# Patient Record
Sex: MALE | Race: WHITE | Employment: FULL TIME | URBAN - METROPOLITAN AREA
[De-identification: names, ages, dates, MRNs, and addresses within clinical notes are randomized per-mention and may not be internally consistent; named-entity substitution may affect disease eponyms.]

---

## 2017-12-13 ENCOUNTER — OFFICE VISIT (OUTPATIENT)
Dept: ORTHOPEDIC SURGERY | Age: 52
End: 2017-12-13

## 2017-12-13 VITALS — WEIGHT: 252.8 LBS | HEIGHT: 69 IN | RESPIRATION RATE: 16 BRPM | BODY MASS INDEX: 37.44 KG/M2

## 2017-12-13 DIAGNOSIS — M79.641 RIGHT HAND PAIN: Primary | ICD-10-CM
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
## 2017-12-13 NOTE — PATIENT INSTRUCTIONS
Hand Pain: Care Instructions  Your Care Instructions    Common causes of hand pain are overuse and injuries, such as might happen during sports or home repair projects. Everyday wear and tear, especially as you get older, also can cause hand pain. Most minor hand injuries will heal on their own, and home treatment is usually all you need to do. If you have sudden and severe pain, you may need tests and treatment. Follow-up care is a key part of your treatment and safety. Be sure to make and go to all appointments, and call your doctor if you are having problems. It's also a good idea to know your test results and keep a list of the medicines you take. How can you care for yourself at home? · Take pain medicines exactly as directed. ¨ If the doctor gave you a prescription medicine for pain, take it as prescribed. ¨ If you are not taking a prescription pain medicine, ask your doctor if you can take an over-the-counter medicine. · Rest and protect your hand. Take a break from any activity that may cause pain. · Put ice or a cold pack on your hand for 10 to 20 minutes at a time. Put a thin cloth between the ice and your skin. · Prop up the sore hand on a pillow when you ice it or anytime you sit or lie down during the next 3 days. Try to keep it above the level of your heart. This will help reduce swelling. · If your doctor recommends a sling, splint, or elastic bandage to support your hand, wear it as directed. When should you call for help? Call 911 anytime you think you may need emergency care. For example, call if:  ? · Your hand turns cool or pale or changes color. ?Call your doctor now or seek immediate medical care if:  ? · You cannot move your hand. ? · Your hand pops, moves out of its normal position, and then returns to its normal position. ? · You have signs of infection, such as:  ¨ Increased pain, swelling, warmth, or redness. ¨ Red streaks leading from the sore area.   ¨ Pus draining from a place on your hand. ¨ A fever. ? · Your hand feels numb or tingly. ? Watch closely for changes in your health, and be sure to contact your doctor if:  ? · Your hand feels unstable when you try to use it. ? · You do not get better as expected. ? · You have any new symptoms, such as swelling. ? · Bruises from an injury to your hand last longer than 2 weeks. Where can you learn more? Go to http://yunier-abel.info/. Enter R273 in the search box to learn more about \"Hand Pain: Care Instructions. \"  Current as of: March 20, 2017  Content Version: 11.4  © 4062-5411 [employer]. Care instructions adapted under license by [employer] (which disclaims liability or warranty for this information). If you have questions about a medical condition or this instruction, always ask your healthcare professional. [address] any warranty or liability for your use of this information.

## 2017-12-13 NOTE — PROGRESS NOTES
Patient: Juan Carlos Clay                MRN: [id number]       SSN: [id number]  YOB: 1965        AGE: 47 y.o. SEX: male  Body mass index is 37.33 kg/(m^2). PCP: Alix Taylor MD  12/13/17    CC: Right hand pain    HPI: Edgar Escudero is a 47year old right handed male who presents with 1 month of right hand pain. He was injured at his job where he works for [employer] in [place], [place]. He was standing on a ladder which fell over and he landed on his right hand and elbow. His elbow pain has subsided but he has continued to note pain and stiffness in the right hand. Specifically in the right middle, ring and long fingers with pain at the MCP joints of those fingers. He is unable to make a full fist due to the pain and also complains of weakness with  strength. He has continued to work during this  Time. He denies any numbness or tingling. His work involves carrying and lifting with his right hand which is difficult. He works as a . History reviewed. No pertinent past medical history. Past Surgical History:   Procedure Laterality Date    HX APPENDECTOMY  2003       Family History   Problem Relation Age of Onset    Dementia Mother     Heart Disease Mother     COPD Father        Social History     Social History    Marital status:      Spouse name: N/A    Number of children: N/A    Years of education: N/A     Occupational History    Not on file.      Social History Main Topics    Smoking status: Former Smoker     Quit date: 2002    Smokeless tobacco: Never Used    Alcohol use No    Drug use: Not on file    Sexual activity: Not on file     Other Topics Concern    Not on file     Social History Narrative    No narrative on file           No Known Allergies      REVIEW OF SYSTEMS:      CON: negative for recent weight loss/gain, fever, or chills  EYE: negative for double or blurry vision  ENT: negative for hoarseness  RS:   negative for cough, URI, SOB  CV:  negative for chest pain, palpitations  GI:    negative for blood in stool, nausea/vomiting  :  negative for blood in urine  MS: As per HPI  Other systems reviewed and noted below. PHYSICAL EXAMINATION:  Visit Vitals    Resp 16    Ht 5' 9\" (1.753 m)    Wt 252 lb 12.8 oz (114.7 kg)    BMI 37.33 kg/m2       GENERAL: Alert and oriented x3, in no acute distress, well-developed, well-nourished. HEENT: Normocephalic, atraumatic. RESP: Non labored breathing with equal chest rise on inspiration. CV: Well perfused extremities. No cyanosis or clubbing noted. ABDOMEN: Soft, non-tender, non-distended. Neck: Full ROM, no pain with ROM testing. MS: Right hand with minimal swelling, no obvious deformity, no signs of trauma. SILT R/U/M distally. Cap refill of fingers WNL. Able to flex/extend each finger and thumb at MCP, DIP, and PIP joints and IP joint of thumb actively and passively. Active and passive full flexion of the MCP/DIP/PIP joints of the small, ring and middle finger of right hand limited due to pain. Minimal pain with motion outside of limits of full flexion at these joints. TTP over MCP joints and collateral ligament insertion sites of small, ring and middle finger. Mild pain with varus/valgus stress at MCP joints. Radiology: 3 views of right hand negative for fracture/dislocation or other acute bony injury. Impression/Plan: Hilario Clifford has a right hand MCP/PIP/DIP sprain of the middle, ring and small fingers. I do not see any evidence of fractures. He has some stiffness associated with his work related injury which is also contributing to his pain. I have recommended that we get him into OT for his right hand to work on his ROM and strengthening and have ordered this today. He can continue to work at this time within his limits of pain. I will see him back in 4 weeks. Home stretching exercises were also discussed.   All of his questions were answered.         Electronically signed by: Virginia Guadarrama MD

## 2018-01-10 ENCOUNTER — OFFICE VISIT (OUTPATIENT)
Dept: ORTHOPEDIC SURGERY | Age: 53
End: 2018-01-10

## 2018-01-10 VITALS
WEIGHT: 252.8 LBS | DIASTOLIC BLOOD PRESSURE: 81 MMHG | HEART RATE: 69 BPM | BODY MASS INDEX: 37.44 KG/M2 | RESPIRATION RATE: 16 BRPM | OXYGEN SATURATION: 96 % | SYSTOLIC BLOOD PRESSURE: 143 MMHG | HEIGHT: 69 IN

## 2018-01-10 DIAGNOSIS — M79.641 RIGHT HAND PAIN: Primary | ICD-10-CM

## 2018-01-10 RX ORDER — DICLOFENAC SODIUM 50 MG/1
50 TABLET, DELAYED RELEASE ORAL 2 TIMES DAILY WITH MEALS
Qty: 60 TAB | Refills: 1 | Status: SHIPPED | OUTPATIENT
Start: 2018-01-10

## 2018-01-10 NOTE — PATIENT INSTRUCTIONS
Hand Pain: Care Instructions  Your Care Instructions    Common causes of hand pain are overuse and injuries, such as might happen during sports or home repair projects. Everyday wear and tear, especially as you get older, also can cause hand pain. Most minor hand injuries will heal on their own, and home treatment is usually all you need to do. If you have sudden and severe pain, you may need tests and treatment. Follow-up care is a key part of your treatment and safety. Be sure to make and go to all appointments, and call your doctor if you are having problems. It's also a good idea to know your test results and keep a list of the medicines you take. How can you care for yourself at home? · Take pain medicines exactly as directed. ¨ If the doctor gave you a prescription medicine for pain, take it as prescribed. ¨ If you are not taking a prescription pain medicine, ask your doctor if you can take an over-the-counter medicine. · Rest and protect your hand. Take a break from any activity that may cause pain. · Put ice or a cold pack on your hand for 10 to 20 minutes at a time. Put a thin cloth between the ice and your skin. · Prop up the sore hand on a pillow when you ice it or anytime you sit or lie down during the next 3 days. Try to keep it above the level of your heart. This will help reduce swelling. · If your doctor recommends a sling, splint, or elastic bandage to support your hand, wear it as directed. When should you call for help? Call 911 anytime you think you may need emergency care. For example, call if:  ? · Your hand turns cool or pale or changes color. ?Call your doctor now or seek immediate medical care if:  ? · You cannot move your hand. ? · Your hand pops, moves out of its normal position, and then returns to its normal position. ? · You have signs of infection, such as:  ¨ Increased pain, swelling, warmth, or redness. ¨ Red streaks leading from the sore area.   ¨ Pus draining from a place on your hand. ¨ A fever. ? · Your hand feels numb or tingly. ? Watch closely for changes in your health, and be sure to contact your doctor if:  ? · Your hand feels unstable when you try to use it. ? · You do not get better as expected. ? · You have any new symptoms, such as swelling. ? · Bruises from an injury to your hand last longer than 2 weeks. Where can you learn more? Go to http://yunier-abel.info/. Enter R273 in the search box to learn more about \"Hand Pain: Care Instructions. \"  Current as of: March 20, 2017  Content Version: 11.4  © 9066-1760 [employer]. Care instructions adapted under license by [employer] (which disclaims liability or warranty for this information). If you have questions about a medical condition or this instruction, always ask your healthcare professional. [address] any warranty or liability for your use of this information.

## 2018-01-10 NOTE — MR AVS SNAPSHOT
Visit Information Date & Time Provider Department Dept. Phone Encounter #  
 1/10/2018 10:30 AM Ankita Frederick MD [facility] [place] [phone] [id number] Follow-up Instructions Return in about 4 weeks (around 2/7/2018). Upcoming Health Maintenance Date Due DTaP/Tdap/Td series (1 - Tdap) 5/14/1987 FOBT Q 1 YEAR AGE 50-75 5/14/2016 Influenza Age 5 to Adult 8/1/2017 Allergies as of 1/10/2018  Review Complete On: 1/10/2018 By: Maria L Del Real LPN No Known Allergies Current Immunizations  Never Reviewed No immunizations on file. Not reviewed this visit You Were Diagnosed With   
  
 Codes Comments Right hand pain    -  Primary ICD-10-CM: C34.767 ICD-9-CM: 729.5 Vitals BP Pulse Resp Height(growth percentile) Weight(growth percentile) SpO2  
 143/81 (BP 1 Location: Left arm, BP Patient Position: Sitting) 69 16 5' 9\" (1.753 m) 252 lb 12.8 oz (114.7 kg) 96% BMI Smoking Status 37.33 kg/m2 Former Smoker BMI and BSA Data Body Mass Index Body Surface Area  
 37.33 kg/m 2 2.36 m 2 Preferred Pharmacy Pharmacy Name Phone [id number] [address] Your Updated Medication List  
  
   
This list is accurate as of: 1/10/18 11:14 AM.  Always use your most recent med list.  
  
  
  
  
 diclofenac EC 50 mg EC tablet Commonly known as:  VOLTAREN Take 1 Tab by mouth two (2) times daily (with meals). Prescriptions Sent to Pharmacy Refills  
 diclofenac EC (VOLTAREN) 50 mg EC tablet 1 Sig: Take 1 Tab by mouth two (2) times daily (with meals). Class: Normal  
 [facility] [address] [place] [address], [address] [place] [address] Ph #: [phone] Route: Oral  
  
Follow-up Instructions Return in about 4 weeks (around 2/7/2018). Patient Instructions Hand Pain: Care Instructions Your Care Instructions Common causes of hand pain are overuse and injuries, such as might happen during sports or home repair projects. Everyday wear and tear, especially as you get older, also can cause hand pain. Most minor hand injuries will heal on their own, and home treatment is usually all you need to do. If you have sudden and severe pain, you may need tests and treatment. Follow-up care is a key part of your treatment and safety. Be sure to make and go to all appointments, and call your doctor if you are having problems. It's also a good idea to know your test results and keep a list of the medicines you take. How can you care for yourself at home? · Take pain medicines exactly as directed. ¨ If the doctor gave you a prescription medicine for pain, take it as prescribed. ¨ If you are not taking a prescription pain medicine, ask your doctor if you can take an over-the-counter medicine. · Rest and protect your hand. Take a break from any activity that may cause pain. · Put ice or a cold pack on your hand for 10 to 20 minutes at a time. Put a thin cloth between the ice and your skin. · Prop up the sore hand on a pillow when you ice it or anytime you sit or lie down during the next 3 days. Try to keep it above the level of your heart. This will help reduce swelling. · If your doctor recommends a sling, splint, or elastic bandage to support your hand, wear it as directed. When should you call for help? Call 911 anytime you think you may need emergency care. For example, call if: 
? · Your hand turns cool or pale or changes color. ?Call your doctor now or seek immediate medical care if: 
? · You cannot move your hand. ? · Your hand pops, moves out of its normal position, and then returns to its normal position. ? · You have signs of infection, such as: 
¨ Increased pain, swelling, warmth, or redness. ¨ Red streaks leading from the sore area. ¨ Pus draining from a place on your hand. ¨ A fever. ? · Your hand feels numb or tingly. ? Watch closely for changes in your health, and be sure to contact your doctor if: 
? · Your hand feels unstable when you try to use it. ? · You do not get better as expected. ? · You have any new symptoms, such as swelling. ? · Bruises from an injury to your hand last longer than 2 weeks. Where can you learn more? Go to http://yunier-abel.info/. Enter R273 in the search box to learn more about \"Hand Pain: Care Instructions. \" Current as of: March 20, 2017 Content Version: 11.4 © 3737-7391 [employer]. Care instructions adapted under license by [employer] (which disclaims liability or warranty for this information). If you have questions about a medical condition or this instruction, always ask your healthcare professional. [place] [address] any warranty or liability for your use of this information. Introducing \[facility]\"" & HEALTH SERVICES! [facility] introduces PlanetTran patient portal. Now you can access parts of your medical record, email your doctor's office, and request medication refills online. 1. In your internet browser, go to [email or web address] 2. Click on the First Time User? Click Here link in the Sign In box. You will see the New Member Sign Up page. 3. Enter your PlanetTran Access Code exactly as it appears below. You will not need to use this code after youve completed the sign-up process. If you do not sign up before the expiration date, you must request a new code. · PlanetTran Access Code: [email or web address] Expires: 3/13/2018  1:32 PM 
 
4. Enter the last four digits of your Social Security Number (xxxx) and Date of Birth (mm/dd/yyyy) as indicated and click Submit. You will be taken to the next sign-up page. 5. Create a PlanetTran ID.  This will be your PlanetTran login ID and cannot be changed, so think of one that is secure and easy to remember. 6. Create a xzoops password. You can change your password at any time. 7. Enter your Password Reset Question and Answer. This can be used at a later time if you forget your password. 8. Enter your e-mail address. You will receive e-mail notification when new information is available in [address] 9. Click Sign Up. You can now view and download portions of your medical record. 10. Click the Download Summary menu link to download a portable copy of your medical information. If you have questions, please visit the Frequently Asked Questions section of the xzoops website. Remember, xzoops is NOT to be used for urgent needs. For medical emergencies, dial 911. Now available from your iPhone and Android! Please provide this summary of care documentation to your next provider. Your primary care clinician is listed as [facility]. If you have any questions after today's visit, please call [phone].

## 2018-02-14 ENCOUNTER — OFFICE VISIT (OUTPATIENT)
Dept: ORTHOPEDIC SURGERY | Age: 53
End: 2018-02-14

## 2018-02-14 VITALS
HEART RATE: 71 BPM | DIASTOLIC BLOOD PRESSURE: 84 MMHG | BODY MASS INDEX: 37.92 KG/M2 | WEIGHT: 256 LBS | RESPIRATION RATE: 16 BRPM | OXYGEN SATURATION: 96 % | HEIGHT: 69 IN | SYSTOLIC BLOOD PRESSURE: 143 MMHG

## 2018-02-14 DIAGNOSIS — M79.641 RIGHT HAND PAIN: Primary | ICD-10-CM

## 2018-02-14 NOTE — PROGRESS NOTES
Patient: Mandy Martin                MRN: [id number]       SSN: [id number]  YOB: 1965        AGE: 47 y.o. SEX: male  Body mass index is 37.8 kg/(m^2). PCP: ONELIA FELIZ MD  02/14/18    HISTORY OF PRESENT ILLNESS: Mr. Juan Antonio Garcia returns today for follow-up of his right hand. He has been doing some home stretching exercises as well as taking an anti-inflammatory. He unfortunately continues to have some issues and pain specifically with  and with terminal flexion of his ring and small finger on his right hand. Due to this lack of confidence in flexion and  strength, he states he does not feel comfortable using it to carry things. It is affecting his activities of daily living. He denies any recent trauma with it. The swelling has mostly gone done. At this point he has not seen a lot of progress since his last visit about one month ago. History reviewed. No pertinent past medical history. Family History   Problem Relation Age of Onset    Dementia Mother     Heart Disease Mother     COPD Father        Current Outpatient Prescriptions   Medication Sig Dispense Refill    diclofenac EC (VOLTAREN) 50 mg EC tablet Take 1 Tab by mouth two (2) times daily (with meals). (Patient not taking: Reported on 2/14/2018) 60 Tab 1       No Known Allergies    Past Surgical History:   Procedure Laterality Date    HX APPENDECTOMY  2003       Social History     Social History    Marital status:      Spouse name: N/A    Number of children: N/A    Years of education: N/A     Occupational History    Not on file.      Social History Main Topics    Smoking status: Former Smoker     Quit date: 2002    Smokeless tobacco: Never Used    Alcohol use No    Drug use: Not on file    Sexual activity: Not on file     Other Topics Concern    Not on file     Social History Narrative       REVIEW OF SYSTEMS:      No changes from previous review of systems unless noted.    PHYSICAL EXAMINATION:  Visit Vitals    /84 (BP 1 Location: Left arm, BP Patient Position: Sitting)    Pulse 71    Resp 16    Ht 5' 9\" (1.753 m)    Wt 256 lb (116.1 kg)    SpO2 96%    BMI 37.8 kg/m2     Body mass index is 37.8 kg/(m^2). GENERAL: Alert and oriented x3, in no acute distress. HEENT: Normocephalic, atraumatic. RESP: Non labored breathing. SKIN: No rashes or lesions noted. PHYSICAL EXAMINATION:  Musculoskeletal examination of the right hand is mostly unchanged from his previous visit. He does not have any swelling. No abnormal deformities of his hand. I think the flexion is slightly improved since his last visit. He is almost able to make a fist with his small and ring finger on his right hand, but he does have pain as well as some stiffness associated with terminal DIP flexion. He also has a little bit of stiffness with MCP and PIP flexion on those two fingers as well. His sensation is intact and his capillary refill is within normal limits. ASSESSMENT/PLAN:  Mr. Kesha Delgado is a 56-year-old male who had a trauma to his right hand and continues to have some issues with making a fist as well as pain in his right hand. I think this has to do with some ligament stiffness between his MCP, DIP, and PIP joints of his small and ring finger. However, since he has not seen much progress in the way of pain control as well as motion and  strength since his last visit, I think I would like to get him into see a hand specialist for a second opinion. I have requested that he go see Dr. Gerson Thorpe with [facility]. I have sent over a referral.  I will await the results of the second opinion to find out if we should change his course of treatment at this time.   He understands this plan and is in agreement with it and will get back in touch with me after he has finished seeing the hand specialist.  Otherwise I would like to see him back in six weeks.           Electronically signed by: Janice Lovell MD

## 2019-08-24 NOTE — PROGRESS NOTES
Patient: Coleen Rios                MRN: [id number]       SSN: [id number]  YOB: 1965        AGE: 47 y.o. SEX: male  Body mass index is 37.33 kg/(m^2). PCP: Art Granados MD  01/10/18    Chief Complaint: No changes from previous visit complaint. HPI: Coleen Rios returns today for a follow up appointment for Right hand pain and stiffness. He has been going to OT and unfortunately his symptoms have not gotten much better. He reports pain in his right hand along with stiffness and difficulty with making a fist.  The OT has been twice per week and he has been doing home stretching. He occasionally takes OTC medications to help his pain. No new injuries. History reviewed. No pertinent past medical history. Family History   Problem Relation Age of Onset    Dementia Mother     Heart Disease Mother     COPD Father        Current Outpatient Prescriptions   Medication Sig Dispense Refill    diclofenac EC (VOLTAREN) 50 mg EC tablet Take 1 Tab by mouth two (2) times daily (with meals). 61 Tab 1       No Known Allergies    Past Surgical History:   Procedure Laterality Date    HX APPENDECTOMY  2003       Social History     Social History    Marital status:      Spouse name: N/A    Number of children: N/A    Years of education: N/A     Occupational History    Not on file. Social History Main Topics    Smoking status: Former Smoker     Quit date: 2002    Smokeless tobacco: Never Used    Alcohol use No    Drug use: Not on file    Sexual activity: Not on file     Other Topics Concern    Not on file     Social History Narrative       REVIEW OF SYSTEMS:      No changes from previous review of systems unless noted. PHYSICAL EXAMINATION:  GENERAL: Alert and oriented x3, in no acute distress. HEENT: Normocephalic, atraumatic. RESP: Non labored breathing. SKIN: No rashes or lesions noted.    MUSCULOSKELETAL: Right hand with mild swelling over ring, long and small finger. Difficulty with fully making a fist with the right hand with terminal flexion of DIP joints of small and ring finger. Pain with attempts at terminal flexion. SILT/NVI distally. No warmth or erythema. Cap refill WNL. TTP over DIP and PIP joints. Radiology: None taken today    Impression/Plan:   1. Right hand stiffness is persistent and maybe has worsened slightly since his last visit. Certainly his pain is subjectively worse. I am going to back his hand OT down to once per week and he is going to do exercises at home. I have also recommended that he take diclofenac to help with the pain for the next month. I will see him back in 1 month for another check.       Electronically signed by: Barry Barnhart MD No

## 2021-12-31 ENCOUNTER — HOSPITAL ENCOUNTER (EMERGENCY)
Age: 56
Discharge: HOME | End: 2021-12-31
Attending: EMERGENCY MEDICINE
Payer: COMMERCIAL

## 2021-12-31 VITALS
BODY MASS INDEX: 35.79 KG/M2 | HEIGHT: 70 IN | WEIGHT: 250 LBS | DIASTOLIC BLOOD PRESSURE: 90 MMHG | HEART RATE: 77 BPM | SYSTOLIC BLOOD PRESSURE: 156 MMHG | TEMPERATURE: 98.3 F | OXYGEN SATURATION: 96 % | RESPIRATION RATE: 20 BRPM

## 2021-12-31 DIAGNOSIS — S01.01XA LACERATION OF SCALP, INITIAL ENCOUNTER: Primary | ICD-10-CM

## 2021-12-31 PROCEDURE — 99283 EMERGENCY DEPT VISIT LOW MDM: CPT

## 2021-12-31 PROCEDURE — 75810000293 HC SIMP/SUPERF WND  RPR

## 2021-12-31 RX ORDER — LIDOCAINE HYDROCHLORIDE AND EPINEPHRINE 10; 10 MG/ML; UG/ML
10 INJECTION, SOLUTION INFILTRATION; PERINEURAL ONCE
Status: DISCONTINUED | OUTPATIENT
Start: 2021-12-31 | End: 2021-12-31 | Stop reason: HOSPADM

## 2021-12-31 RX ORDER — KETOROLAC TROMETHAMINE 10 MG/1
10 TABLET, FILM COATED ORAL
Qty: 10 TABLET | Refills: 0 | Status: SHIPPED | OUTPATIENT
Start: 2021-12-31

## 2021-12-31 RX ORDER — DOXYCYCLINE 100 MG/1
100 CAPSULE ORAL 2 TIMES DAILY
Qty: 10 CAPSULE | Refills: 0 | Status: SHIPPED | OUTPATIENT
Start: 2021-12-31 | End: 2022-01-07

## 2021-12-31 RX ORDER — LISINOPRIL AND HYDROCHLOROTHIAZIDE 12.5; 2 MG/1; MG/1
1 TABLET ORAL DAILY
COMMUNITY
Start: 2021-11-29

## 2021-12-31 NOTE — ED TRIAGE NOTES
Pt reports he was walking under a rack that had bolts on the bottom when he hit his head causing a laceration, last tetanus vaccine was about 3 years ago.

## 2022-01-04 NOTE — ED PROVIDER NOTES
EMERGENCY DEPARTMENT HISTORY AND PHYSICAL EXAM      Date: 2021  Patient Name: Leatha Ny    History of Presenting Illness     Chief Complaint   Patient presents with    Laceration       History Provided By: Patient    HPI: Leatha Ny, 55 y.o. male with a past medical history significant hypertension and covid presents to the ED with cc of laceration to the head. Patient reports he was walking and a rake that he bowls at the bottom when he hit his head causing a laceration. Injury happened this morning. He has no headache. Covid positive but he has no chest pain, shortness of breath or fever. There are no other complaints, changes, or physical findings at this time. PCP: Lisandro Yan. Papi Castro MD    No current facility-administered medications on file prior to encounter. Current Outpatient Medications on File Prior to Encounter   Medication Sig Dispense Refill    lisinopril-hydroCHLOROthiazide (PRINZIDE, ZESTORETIC) 20-12.5 mg per tablet Take 1 Tablet by mouth daily.  diclofenac EC (VOLTAREN) 50 mg EC tablet Take 1 Tab by mouth two (2) times daily (with meals). (Patient not taking: Reported on 2018) 60 Tab 1       Past History     Past Medical History:  Past Medical History:   Diagnosis Date    Hypertension        Past Surgical History:  Past Surgical History:   Procedure Laterality Date    HX APPENDECTOMY         Family History:  Family History   Problem Relation Age of Onset    Dementia Mother     Heart Disease Mother     COPD Father        Social History:  Social History     Tobacco Use    Smoking status: Former Smoker     Quit date:      Years since quittin.0    Smokeless tobacco: Never Used   Vaping Use    Vaping Use: Never used   Substance Use Topics    Alcohol use: No    Drug use: Never       Allergies:  No Known Allergies      Review of Systems     Review of Systems   All other systems reviewed and are negative.       Physical Exam     Physical Exam  Vitals and nursing note reviewed. Constitutional:       General: He is not in acute distress. Appearance: He is well-developed. He is not diaphoretic. Comments: Appears in no acute distress. HENT:      Head: Normocephalic and atraumatic. No right periorbital erythema or left periorbital erythema. Jaw: No trismus. Right Ear: External ear normal. No drainage or swelling. Tympanic membrane is not perforated, erythematous or bulging. Left Ear: External ear normal. No drainage or swelling. Tympanic membrane is not perforated, erythematous or bulging. Nose: Nose normal. No mucosal edema or rhinorrhea. Right Sinus: No maxillary sinus tenderness or frontal sinus tenderness. Left Sinus: No maxillary sinus tenderness or frontal sinus tenderness. Mouth/Throat:      Mouth: No oral lesions. Dentition: No dental abscesses. Pharynx: Uvula midline. No oropharyngeal exudate, posterior oropharyngeal erythema or uvula swelling. Tonsils: No tonsillar abscesses. Eyes:      General: No scleral icterus. Right eye: No discharge. Left eye: No discharge. Conjunctiva/sclera: Conjunctivae normal.   Cardiovascular:      Rate and Rhythm: Normal rate and regular rhythm. Heart sounds: Normal heart sounds. No murmur heard. No friction rub. No gallop. Pulmonary:      Effort: Pulmonary effort is normal. No tachypnea, accessory muscle usage or respiratory distress. Breath sounds: Normal breath sounds. No decreased breath sounds, wheezing, rhonchi or rales. Abdominal:      General: Bowel sounds are normal. There is no distension. Palpations: Abdomen is soft. Tenderness: There is no abdominal tenderness. Musculoskeletal:         General: No tenderness. Normal range of motion. Cervical back: Normal range of motion and neck supple. Lymphadenopathy:      Cervical: No cervical adenopathy. Skin:     General: Skin is warm and dry. Neurological:      Mental Status: He is alert and oriented to person, place, and time. Psychiatric:         Judgment: Judgment normal.         Lab and Diagnostic Study Results     Labs -   No results found for this or any previous visit (from the past 12 hour(s)). Radiologic Studies -   @lastxrresult@  CT Results  (Last 48 hours)    None        CXR Results  (Last 48 hours)    None            Medical Decision Making   - I am the first provider for this patient. - I reviewed the vital signs, available nursing notes, past medical history, past surgical history, family history and social history. - Initial assessment performed. The patients presenting problems have been discussed, and they are in agreement with the care plan formulated and outlined with them. I have encouraged them to ask questions as they arise throughout their visit. Vital Signs-Reviewed the patient's vital signs. No data found. Records Reviewed: Nursing Notes and Old Medical Records    The patient presents with scalp laceration. ED Course:          Provider Notes (Medical Decision Making):           Procedures   Medical Decision Makingedical Decision Making  Performed by: Mattie Veliz MD  PROCEDURES:  Wound Repair    Date/Time: 12/31/2021 6:30 PM  Performed by: attendingPreparation: skin prepped with Betadine  Pre-procedure re-eval: Immediately prior to the procedure, the patient was reevaluated and found suitable for the planned procedure and any planned medications. Time out: Immediately prior to the procedure a time out was called to verify the correct patient, procedure, equipment, staff and marking as appropriate. .  Location details: scalp  Wound length:12.6 - 20 cm  Anesthesia: local infiltration    Anesthesia:  Anesthetic total: 5 mL  Irrigation solution: saline  Irrigation method: jet lavage  Debridement: minimal  Skin closure: staples  Number of sutures: 14  Approximation: close  Dressing: antibiotic ointment  Patient tolerance: patient tolerated the procedure well with no immediate complications  My total time at bedside, performing this procedure was 16-30 minutes. Disposition   Disposition: Condition stable and improved  DC- Adult Discharges: All of the diagnostic tests were reviewed and questions answered. Diagnosis, care plan and treatment options were discussed. The patient understands the instructions and will follow up as directed. The patients results have been reviewed with them. They have been counseled regarding their diagnosis. The patient verbally convey understanding and agreement of the signs, symptoms, diagnosis, treatment and prognosis and additionally agrees to follow up as recommended with their PCP in 24 - 48 hours. They also agree with the care-plan and convey that all of their questions have been answered. I have also put together some discharge instructions for them that include: 1) educational information regarding their diagnosis, 2) how to care for their diagnosis at home, as well a 3) list of reasons why they would want to return to the ED prior to their follow-up appointment, should their condition change. Diagnosis:   1. Laceration of scalp, initial encounter          Disposition:     Follow-up Information     Follow up With Specialties Details Why KWESI Perdomo, [address] In 1 week For suture removal [address] [place] [id number]  [phone]      [facility] EMERGENCY DEPT Emergency Medicine   [place] [id number]  [phone]          Discharge Medication List as of 12/31/2021  7:21 PM      START taking these medications    Details   doxycycline (MONODOX) 100 mg capsule Take 1 Capsule by mouth two (2) times a day for 7 days. , Normal, Disp-10 Capsule, R-0      ketorolac (TORADOL) 10 mg tablet Take 1 Tablet by mouth every eight (8) hours as needed for Pain., Normal, Disp-10 Tablet, R-0         CONTINUE these medications which have NOT CHANGED    Details   lisinopril-hydroCHLOROthiazide (PRINZIDE, ZESTORETIC) 20-12.5 mg per tablet Take 1 Tablet by mouth daily. , Historical Med      diclofenac EC (VOLTAREN) 50 mg EC tablet Take 1 Tab by mouth two (2) times daily (with meals). , Normal, Disp-60 Tab, R-1             DISCHARGE PLAN:  1. Cannot display discharge medications since this patient is not currently admitted. 2.   Follow-up Information     Follow up With Specialties Details Why KWESI Perdomo [place], [address] In 1 week For suture removal [address] [address]  [phone]      [facility] EMERGENCY DEPT Emergency Medicine   [facility] [id number] [phone]        3. Return to ED if worse   4. Discharge Medication List as of 12/31/2021  7:21 PM      START taking these medications    Details   doxycycline (MONODOX) 100 mg capsule Take 1 Capsule by mouth two (2) times a day for 7 days. , Normal, Disp-10 Capsule, R-0      ketorolac (TORADOL) 10 mg tablet Take 1 Tablet by mouth every eight (8) hours as needed for Pain., Normal, Disp-10 Tablet, R-0         CONTINUE these medications which have NOT CHANGED    Details   lisinopril-hydroCHLOROthiazide (PRINZIDE, ZESTORETIC) 20-12.5 mg per tablet Take 1 Tablet by mouth daily. , Historical Med      diclofenac EC (VOLTAREN) 50 mg EC tablet Take 1 Tab by mouth two (2) times daily (with meals). , Normal, Disp-60 Tab, R-1               Diagnosis     Clinical Impression:   1. Laceration of scalp, initial encounter        Attestations:    Rik Martell MD    Please note that this dictation was completed with [employer], the [employer] voice recognition software. Quite often unanticipated grammatical, syntax, homophones, and other interpretive errors are inadvertently transcribed by the computer software. Please disregard these errors. Please excuse any errors that have escaped final proofreading. Thank you.

## 2024-01-22 ENCOUNTER — OFFICE VISIT (OUTPATIENT)
Age: 59
End: 2024-01-22
Payer: COMMERCIAL

## 2024-01-22 VITALS — WEIGHT: 258 LBS | HEIGHT: 70 IN | BODY MASS INDEX: 36.94 KG/M2

## 2024-01-22 DIAGNOSIS — G89.29 CHRONIC PAIN OF LEFT KNEE: Primary | ICD-10-CM

## 2024-01-22 DIAGNOSIS — M17.12 OSTEOARTHRITIS OF LEFT KNEE, UNSPECIFIED OSTEOARTHRITIS TYPE: ICD-10-CM

## 2024-01-22 DIAGNOSIS — M25.562 CHRONIC PAIN OF LEFT KNEE: Primary | ICD-10-CM

## 2024-01-22 DIAGNOSIS — M25.562 LEFT KNEE PAIN, UNSPECIFIED CHRONICITY: ICD-10-CM

## 2024-01-22 PROCEDURE — 20611 DRAIN/INJ JOINT/BURSA W/US: CPT | Performed by: ORTHOPAEDIC SURGERY

## 2024-01-22 PROCEDURE — 99203 OFFICE O/P NEW LOW 30 MIN: CPT | Performed by: ORTHOPAEDIC SURGERY

## 2024-01-22 RX ORDER — LIDOCAINE HYDROCHLORIDE 10 MG/ML
9 INJECTION, SOLUTION INFILTRATION; PERINEURAL ONCE
Status: COMPLETED | OUTPATIENT
Start: 2024-01-22 | End: 2024-01-22

## 2024-01-22 RX ORDER — AMLODIPINE BESYLATE 5 MG/1
5 TABLET ORAL DAILY
COMMUNITY
Start: 2023-12-17

## 2024-01-22 RX ORDER — BUDESONIDE 3 MG/1
CAPSULE, COATED PELLETS ORAL
COMMUNITY
Start: 2024-01-04

## 2024-01-22 RX ORDER — TRIAMCINOLONE ACETONIDE 40 MG/ML
40 INJECTION, SUSPENSION INTRA-ARTICULAR; INTRAMUSCULAR ONCE
Status: COMPLETED | OUTPATIENT
Start: 2024-01-22 | End: 2024-01-22

## 2024-01-22 RX ADMIN — LIDOCAINE HYDROCHLORIDE 9 ML: 10 INJECTION, SOLUTION INFILTRATION; PERINEURAL at 16:05

## 2024-01-22 RX ADMIN — TRIAMCINOLONE ACETONIDE 40 MG: 40 INJECTION, SUSPENSION INTRA-ARTICULAR; INTRAMUSCULAR at 16:05

## 2024-01-22 SDOH — HEALTH STABILITY: PHYSICAL HEALTH: ON AVERAGE, HOW MANY DAYS PER WEEK DO YOU ENGAGE IN MODERATE TO STRENUOUS EXERCISE (LIKE A BRISK WALK)?: 0 DAYS

## 2024-01-22 NOTE — PROGRESS NOTES
Name: Klarissa Palacios    : 1965     [facility] ORTHOPAEDICS AND SPORTS MEDICINE  [address] [facility] [address]  [facility] [address]  Dept: [phone]  Dept Fax: [phone]     Chief Complaint   Patient presents with    Knee Pain        Ht 1.778 m (5' 10\")   Wt 117 kg (258 lb)   BMI 37.02 kg/m²      No Known Allergies     Current Outpatient Medications   Medication Sig Dispense Refill    amLODIPine (NORVASC) 5 MG tablet Take 1 tablet by mouth daily      budesonide (ENTOCORT EC) 3 MG delayed release capsule TAKE 3 CAPSULES BY MOUTH ONCE A DAY      lisinopril-hydroCHLOROthiazide (PRINZIDE;ZESTORETIC) 20-12.5 MG per tablet Take 1 tablet by mouth daily       No current facility-administered medications for this visit.      There is no problem list on file for this patient.     Family History   Problem Relation Age of Onset    Heart Disease Mother     COPD Father     Dementia Mother        Past Surgical History:   Procedure Laterality Date    APPENDECTOMY        Past Medical History:   Diagnosis Date    GERD (gastroesophageal reflux disease)     Hypertension         I have reviewed and agree with PFSH and ROS and intake form in chart and the record furthermore I have reviewed prior medical record(s) regarding this patients care during this appointment.     Review of Systems:   Patient is a pleasant appearing individual, appropriately dressed, well hydrated, well nourished, who is alert, appropriately oriented for age, and in no acute distress with a normal gait and normal affect who does not appear to be in any significant pain.    Physical Exam:  Left Knee -Decrease range of motion with flexion, Knee arc of greater than 50 degrees, Some crepitation, Grossly neurovascularly intact, Good cap refill, No skin lesion, Moderate swelling, No gross instability, Some quadriceps weakness, greater than 50 degree arc    Right Knee - Full Range of Motion, No